# Patient Record
Sex: FEMALE | Race: WHITE | URBAN - METROPOLITAN AREA
[De-identification: names, ages, dates, MRNs, and addresses within clinical notes are randomized per-mention and may not be internally consistent; named-entity substitution may affect disease eponyms.]

---

## 2023-11-02 ENCOUNTER — HOME HEALTH ADMISSION (OUTPATIENT)
Dept: HOME HEALTH SERVICES | Facility: HOME HEALTH | Age: 83
End: 2023-11-02
Payer: MEDICARE

## 2023-11-05 ENCOUNTER — HOME CARE VISIT (OUTPATIENT)
Dept: SCHEDULING | Facility: HOME HEALTH | Age: 83
End: 2023-11-05

## 2023-11-05 PROCEDURE — 0221000100 HH NO PAY CLAIM PROCEDURE

## 2023-11-05 PROCEDURE — G0299 HHS/HOSPICE OF RN EA 15 MIN: HCPCS

## 2023-11-06 ENCOUNTER — HOME CARE VISIT (OUTPATIENT)
Dept: SCHEDULING | Facility: HOME HEALTH | Age: 83
End: 2023-11-06

## 2023-11-06 PROCEDURE — G0151 HHCP-SERV OF PT,EA 15 MIN: HCPCS

## 2023-11-07 ENCOUNTER — HOME CARE VISIT (OUTPATIENT)
Dept: SCHEDULING | Facility: HOME HEALTH | Age: 83
End: 2023-11-07

## 2023-11-07 VITALS
DIASTOLIC BLOOD PRESSURE: 76 MMHG | OXYGEN SATURATION: 91 % | SYSTOLIC BLOOD PRESSURE: 163 MMHG | HEART RATE: 66 BPM | RESPIRATION RATE: 18 BRPM | TEMPERATURE: 97.4 F

## 2023-11-07 PROCEDURE — G0152 HHCP-SERV OF OT,EA 15 MIN: HCPCS

## 2023-11-07 NOTE — HOME HEALTH
This patient has answered NO to the following questions:   1) have you traveled outside the country   2) have you been exposed to or been in contact with anyone whom traveled outside the country in the past two weeks   3) do you have a sore throat/fever/dry cough      4)The patient has been educated on and demonstrates good understanding via the teach-back method for the following: Signs and symptoms of COVID-19 yes    Pt is an 79year old female who lives in [address] [place] [address], [address] alone, family close by for assist as needed. Pt referred for therapy due to decrease in mobility /balance making ADLs and IADLs more difficult and putting her at risk of falls. Pt ambulates with walker and performs functional transers with min assist with VC for proper management of walker  ,  demo's decresaed standing tolerance/balance requiring frequent rest periods with ADL's and IADL's and UE support at all times. . Pt attempted shower transfer but did not complete as step over tub with grab bar still difficult, pt's daughter reports she has been bathing at sink mostly and they are looking into possibly renovating shower to walk in shower. OT recommended pt not bathe in shower without assistance. Pt would benefit with raised toilet seat with bars and OT discussed same with daughter. Pt demo's bilat UE strength 3+/5 for transfers. Pt would benefit with OT to increase safety and indep with ADL's, IADL's, increase bilat UE strength for functional transfers, adaptive equipment recommendations and training, increasing standing tolerance/balance for safety with ADL's with fewer rest periods. Discussed DC plan  with pt and caregiver, plan to DC when goals met  Pt's goal is to remain in her home with no falls.

## 2023-11-08 ENCOUNTER — HOME CARE VISIT (OUTPATIENT)
Dept: SCHEDULING | Facility: HOME HEALTH | Age: 83
End: 2023-11-08

## 2023-11-08 VITALS
RESPIRATION RATE: 14 BRPM | SYSTOLIC BLOOD PRESSURE: 128 MMHG | DIASTOLIC BLOOD PRESSURE: 71 MMHG | TEMPERATURE: 98.1 F | HEART RATE: 72 BPM

## 2023-11-08 PROCEDURE — G0155 HHCP-SVS OF CSW,EA 15 MIN: HCPCS

## 2023-11-08 ASSESSMENT — ENCOUNTER SYMPTOMS
SKIN LESIONS: 1
HEMOPTYSIS: 0
HEMOPTYSIS: 0

## 2023-11-08 NOTE — CASE COMMUNICATION
provided teaching verbally and in writing regarding [address] on Aging for [address] [facility] [address] alert and regarding Medicaid Medicare Dual Complete Programs with extra benefits for home delivered food and wearable medical alert added benefits to  increase safety. Patient is seen weekly by adult children on Saturdays, but son is closest in [facility] as needed. Patient is scheduled for deep cleaning of home.

## 2023-11-08 NOTE — HOME HEALTH
Patient was hospitalized after not taking her blood pressure medicine. Patient uses walker to ambulate. Poor balance and coordination. Patient had issues with toenails were becoming extreme with fungus, but is now seeing a Podiatrist.  Patient has 3 daughters and a son who see patient Saturdays, rotating the Saturdays patient seen, for once weekly care. Patient does not go out with adult children at all. Patient reports eyes hurt with sunlight. Patient has glaucoma and cataracts. Patient reports she sees well. Patient hearing is severely impaired. Patient memory is impaired. Patient needs assistance to remember how to take her medications and does not report issues of concern early enough, which is why doctor was concerned. Patient doctor wanted patient family to consider DEVIKA, but patient is resistant to DEVIKA. Patient is going to convert her tub into a walk in shower since she refuses to move. Patient lives alone and is isolated with exception of visit once weekly by adult children. Patient reports she gets up and walks and sweeps and does dishes and cleans daily. Patient reports no contact with neighbors, no friends.  provided teaching verbally and in writing regarding [address], Meals on Wheels and Transportation per daughter request.   Patient has some infrequent visits by adult grandchildren as well, but rare. Patient is not concerned with isolation. Patient hearing a barrier to her going out and having contact with people because she is nearly deaf. Patient is resistant to going out with daughter. Patient daughter reports patient does crossword puzzles and word searches. No unmet needs reported by patient or daughter. 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 No concerns with eating or sleeping. No additional Social Work Visits required if family is providing support and patient is happy the way things are.

## 2023-11-09 VITALS
RESPIRATION RATE: 18 BRPM | TEMPERATURE: 98 F | HEART RATE: 74 BPM | DIASTOLIC BLOOD PRESSURE: 80 MMHG | SYSTOLIC BLOOD PRESSURE: 146 MMHG | OXYGEN SATURATION: 97 %

## 2023-11-09 ASSESSMENT — ENCOUNTER SYMPTOMS
DYSPNEA ACTIVITY LEVEL: AFTER AMBULATING MORE THAN 20 FT
PAIN LOCATION - PAIN QUALITY: DULL

## 2023-11-09 NOTE — HOME HEALTH
SOC today 11/5/23  79 yr old female who is alert and oriented x 4,minmally forgetful per daughters and very [place] even with HA's. She lives in her condo alone with her children poping in at least 1 x per week. They assist with grocery shopping and outings, appt's and house keeping. Da's [place] and [employer] both present during this [facility] visit due to the hearing deficit. PMH-Htn, recently noted to be uncontrolled, hld, glaucoma, thyroid disease and osteoporosis. She had a wound to L foot great toe but this has now closed over with flaky skin. She has toenail fungus to ryan feet-sees podiatry fore nail clipping and treatment. She is weak and has unsteady gait and will benefit from therapies that are ordered and evals pending. Children fill her med pill box-there are 4 of them that take turns weekly helping patient. Patient is very stubborn and independent but the kids were wanting her to sell her condo and move into assisted living but she refuses at this time. MSW referral pending to see if she qualifies [facility] as her income is under 1500.00 monthly. Meds reconciled and available in [facility]-current   Education provided to patient and daughters-no added salt, increase fluids, always use assistive devices  Discharge planning-D/C when goals met.

## 2023-11-10 ENCOUNTER — HOME CARE VISIT (OUTPATIENT)
Dept: SCHEDULING | Facility: HOME HEALTH | Age: 83
End: 2023-11-10

## 2023-11-10 PROCEDURE — G0299 HHS/HOSPICE OF RN EA 15 MIN: HCPCS

## 2023-11-11 VITALS
TEMPERATURE: 97.6 F | SYSTOLIC BLOOD PRESSURE: 160 MMHG | RESPIRATION RATE: 18 BRPM | HEART RATE: 94 BPM | DIASTOLIC BLOOD PRESSURE: 80 MMHG | OXYGEN SATURATION: 96 %

## 2023-11-21 ENCOUNTER — HOME CARE VISIT (OUTPATIENT)
Dept: HOME HEALTH SERVICES | Facility: HOME HEALTH | Age: 83
End: 2023-11-21
Payer: MEDICARE